# Patient Record
Sex: FEMALE | Race: OTHER | NOT HISPANIC OR LATINO | URBAN - METROPOLITAN AREA
[De-identification: names, ages, dates, MRNs, and addresses within clinical notes are randomized per-mention and may not be internally consistent; named-entity substitution may affect disease eponyms.]

---

## 2018-12-22 ENCOUNTER — EMERGENCY (EMERGENCY)
Facility: HOSPITAL | Age: 33
LOS: 1 days | Discharge: HOME | End: 2018-12-22
Attending: EMERGENCY MEDICINE | Admitting: EMERGENCY MEDICINE
Payer: COMMERCIAL

## 2018-12-22 ENCOUNTER — EMERGENCY (EMERGENCY)
Facility: HOSPITAL | Age: 33
LOS: 1 days | Discharge: ANOTHER HOSPITAL | End: 2018-12-22
Attending: EMERGENCY MEDICINE | Admitting: EMERGENCY MEDICINE
Payer: COMMERCIAL

## 2018-12-22 VITALS
RESPIRATION RATE: 18 BRPM | DIASTOLIC BLOOD PRESSURE: 78 MMHG | OXYGEN SATURATION: 96 % | HEART RATE: 96 BPM | TEMPERATURE: 98 F | SYSTOLIC BLOOD PRESSURE: 120 MMHG

## 2018-12-22 VITALS
RESPIRATION RATE: 18 BRPM | HEART RATE: 94 BPM | TEMPERATURE: 98 F | OXYGEN SATURATION: 98 % | DIASTOLIC BLOOD PRESSURE: 81 MMHG | SYSTOLIC BLOOD PRESSURE: 116 MMHG

## 2018-12-22 VITALS
HEART RATE: 75 BPM | RESPIRATION RATE: 18 BRPM | SYSTOLIC BLOOD PRESSURE: 130 MMHG | OXYGEN SATURATION: 98 % | DIASTOLIC BLOOD PRESSURE: 76 MMHG | TEMPERATURE: 99 F

## 2018-12-22 VITALS
RESPIRATION RATE: 16 BRPM | SYSTOLIC BLOOD PRESSURE: 117 MMHG | DIASTOLIC BLOOD PRESSURE: 79 MMHG | OXYGEN SATURATION: 98 % | TEMPERATURE: 99 F | HEART RATE: 88 BPM

## 2018-12-22 DIAGNOSIS — Z98.890 OTHER SPECIFIED POSTPROCEDURAL STATES: Chronic | ICD-10-CM

## 2018-12-22 DIAGNOSIS — R04.0 EPISTAXIS: ICD-10-CM

## 2018-12-22 DIAGNOSIS — Z79.899 OTHER LONG TERM (CURRENT) DRUG THERAPY: ICD-10-CM

## 2018-12-22 LAB
ALBUMIN SERPL ELPH-MCNC: 4.2 G/DL — SIGNIFICANT CHANGE UP (ref 3.4–5)
ALP SERPL-CCNC: 62 U/L — SIGNIFICANT CHANGE UP (ref 40–120)
ALT FLD-CCNC: 15 U/L — SIGNIFICANT CHANGE UP (ref 12–42)
ANION GAP SERPL CALC-SCNC: 9 MMOL/L — SIGNIFICANT CHANGE UP (ref 9–16)
APPEARANCE UR: CLEAR — SIGNIFICANT CHANGE UP
APTT BLD: 32.7 SEC — SIGNIFICANT CHANGE UP (ref 27.5–36.3)
AST SERPL-CCNC: 21 U/L — SIGNIFICANT CHANGE UP (ref 15–37)
BILIRUB SERPL-MCNC: 0.5 MG/DL — SIGNIFICANT CHANGE UP (ref 0.2–1.2)
BILIRUB UR-MCNC: NEGATIVE — SIGNIFICANT CHANGE UP
BUN SERPL-MCNC: 9 MG/DL — SIGNIFICANT CHANGE UP (ref 7–23)
CALCIUM SERPL-MCNC: 9.2 MG/DL — SIGNIFICANT CHANGE UP (ref 8.5–10.5)
CHLORIDE SERPL-SCNC: 105 MMOL/L — SIGNIFICANT CHANGE UP (ref 96–108)
CO2 SERPL-SCNC: 26 MMOL/L — SIGNIFICANT CHANGE UP (ref 22–31)
COLOR SPEC: YELLOW — SIGNIFICANT CHANGE UP
CREAT SERPL-MCNC: 0.64 MG/DL — SIGNIFICANT CHANGE UP (ref 0.5–1.3)
DIFF PNL FLD: NEGATIVE — SIGNIFICANT CHANGE UP
ETHANOL SERPL-MCNC: <3 MG/DL — SIGNIFICANT CHANGE UP
GLUCOSE SERPL-MCNC: 93 MG/DL — SIGNIFICANT CHANGE UP (ref 70–99)
GLUCOSE UR QL: NEGATIVE — SIGNIFICANT CHANGE UP
HCG SERPL-ACNC: <1 MIU/ML — SIGNIFICANT CHANGE UP
HCT VFR BLD CALC: 37.7 % — SIGNIFICANT CHANGE UP (ref 34.5–45)
HGB BLD-MCNC: 12.6 G/DL — SIGNIFICANT CHANGE UP (ref 11.5–15.5)
INR BLD: 0.97 — SIGNIFICANT CHANGE UP (ref 0.88–1.16)
KETONES UR-MCNC: NEGATIVE — SIGNIFICANT CHANGE UP
LEUKOCYTE ESTERASE UR-ACNC: NEGATIVE — SIGNIFICANT CHANGE UP
MCHC RBC-ENTMCNC: 30.2 PG — SIGNIFICANT CHANGE UP (ref 27–34)
MCHC RBC-ENTMCNC: 33.4 G/DL — SIGNIFICANT CHANGE UP (ref 32–36)
MCV RBC AUTO: 90.4 FL — SIGNIFICANT CHANGE UP (ref 80–100)
NITRITE UR-MCNC: NEGATIVE — SIGNIFICANT CHANGE UP
PCP SPEC-MCNC: SIGNIFICANT CHANGE UP
PH UR: 8.5 — HIGH (ref 5–8)
PLATELET # BLD AUTO: 294 K/UL — SIGNIFICANT CHANGE UP (ref 150–400)
POTASSIUM SERPL-MCNC: 4 MMOL/L — SIGNIFICANT CHANGE UP (ref 3.5–5.3)
POTASSIUM SERPL-SCNC: 4 MMOL/L — SIGNIFICANT CHANGE UP (ref 3.5–5.3)
PROT SERPL-MCNC: 7.7 G/DL — SIGNIFICANT CHANGE UP (ref 6.4–8.2)
PROT UR-MCNC: NEGATIVE MG/DL — SIGNIFICANT CHANGE UP
PROTHROM AB SERPL-ACNC: 10.7 SEC — SIGNIFICANT CHANGE UP (ref 10–12.9)
RBC # BLD: 4.17 M/UL — SIGNIFICANT CHANGE UP (ref 3.8–5.2)
RBC # FLD: 12.7 % — SIGNIFICANT CHANGE UP (ref 10.3–14.5)
SODIUM SERPL-SCNC: 140 MMOL/L — SIGNIFICANT CHANGE UP (ref 132–145)
SP GR SPEC: 1.01 — SIGNIFICANT CHANGE UP (ref 1–1.03)
UROBILINOGEN FLD QL: 0.2 E.U./DL — SIGNIFICANT CHANGE UP
WBC # BLD: 7.4 K/UL — SIGNIFICANT CHANGE UP (ref 3.8–10.5)
WBC # FLD AUTO: 7.4 K/UL — SIGNIFICANT CHANGE UP (ref 3.8–10.5)

## 2018-12-22 PROCEDURE — 99291 CRITICAL CARE FIRST HOUR: CPT

## 2018-12-22 PROCEDURE — 99283 EMERGENCY DEPT VISIT LOW MDM: CPT | Mod: 25

## 2018-12-22 PROCEDURE — 99284 EMERGENCY DEPT VISIT MOD MDM: CPT | Mod: 25

## 2018-12-22 RX ORDER — DEXTROAMPHETAMINE SACCHARATE, AMPHETAMINE ASPARTATE, DEXTROAMPHETAMINE SULFATE AND AMPHETAMINE SULFATE 1.875; 1.875; 1.875; 1.875 MG/1; MG/1; MG/1; MG/1
1 TABLET ORAL
Qty: 0 | Refills: 0 | COMMUNITY

## 2018-12-22 RX ORDER — OMEPRAZOLE 10 MG/1
1 CAPSULE, DELAYED RELEASE ORAL
Qty: 0 | Refills: 0 | COMMUNITY

## 2018-12-22 RX ORDER — DULOXETINE HYDROCHLORIDE 30 MG/1
1 CAPSULE, DELAYED RELEASE ORAL
Qty: 0 | Refills: 0 | COMMUNITY

## 2018-12-22 NOTE — ED PROVIDER NOTE - OBJECTIVE STATEMENT
33F c/o nosebleed. pt s/p nasal surgery at [facility] 11/29.  was evaluated at [facility], attempted pressure, afrin and packing, however persistent bleeding. pt transferred for ENT valuation.  no dizziness.  no trauma.

## 2018-12-22 NOTE — ED ADULT NURSE NOTE - OBJECTIVE STATEMENT
Presents to ED by transfer from [facility] for epistaxis evaluation by ENT MD.  Patient reports recent procedure to nose and was worried when bleeding did not stop on its own after 3 hours.  Patient denies any recent trauma to the area.

## 2018-12-22 NOTE — ED ADULT NURSE REASSESSMENT NOTE - NS ED NURSE REASSESS COMMENT FT1
Dr Gonzalez attempted to use a nasal  rhino, however pt is still bleeding through her nose. Afrin given as ordered. Manual pressure done. Pt's airway is patent at this time. Pt will be transferred to [facility] for further evaluation

## 2018-12-22 NOTE — ED PROVIDER NOTE - MEDICAL DECISION MAKING DETAILS
bilateral epistaxis s/p recent nasal surgery at [facility], attempted packing, pressure, Afrin, Hematoocrit checked, pt to be transferred to [facility] d/w Dr Hernandez ENT and Dr Aranda ED attending and transfer center

## 2018-12-22 NOTE — ED ADULT TRIAGE NOTE - CHIEF COMPLAINT QUOTE
Patient brought in by ambulance presents to the ED complaining of nose bleed ongoing for about 3 hours. + prior procedure to the nose x 3 weeks back.

## 2018-12-22 NOTE — ED ADULT NURSE NOTE - NSIMPLEMENTINTERV_GEN_ALL_ED
Implemented All Universal Safety Interventions:  [place] to call system. Call bell, personal items and telephone within reach. Instruct patient to call for assistance. Room bathroom lighting operational. Non-slip footwear when patient is off stretcher. Physically safe environment: no spills, clutter or unnecessary equipment. Stretcher in lowest position, wheels locked, appropriate side rails in place.

## 2018-12-22 NOTE — ED PROVIDER NOTE - MEDICAL DECISION MAKING DETAILS
transferred from [facility] for epistaxis. pt s/p nasal surg  -ENT consulted

## 2018-12-22 NOTE — ED ADULT NURSE NOTE - CHPI ED NUR SYMPTOMS NEG
no fever/no loss of consciousness/no bleeding gums/no chills/no syncope/no nausea/no numbness/no weakness/no vomiting

## 2018-12-22 NOTE — ED ADULT NURSE NOTE - PMH
Gastroesophageal reflux disease, esophagitis presence not specified Gastroesophageal reflux disease, esophagitis presence not specified    Primary narcolepsy without cataplexy    Sleep apnea, unspecified type

## 2018-12-22 NOTE — ED PROVIDER NOTE - NSFOLLOWUPINSTRUCTIONS_ED_ALL_ED_FT
Epistaxis    Epistaxis is the medical term for a nosebleed. Nosebleeds are common and can be caused by many conditions, such as injury, infections, dry environments, medicines, nose picking, and home heating and cooling systems. Try controlling your nosebleed by pinching your nose continuously for at least 10 minutes. Avoid lying down while you are having a nosebleed. Sit up and lean forward. Avoid blowing or sniffing your nose for a number of hours after having a nosebleed. Resume your normal activities as you are able, but avoid straining, lifting, or bending at the waist for several days. Maintain humidity in your home by using less air conditioning or by using a humidifier.     If your nose was packed by your health care provider, keep the packing inside of your nose until a health care provider removes it. If a balloon catheter was used to pack your nose, do not cut or remove it unless your health care provider has instructed you to do that.     Aspirin and blood thinners make bleeding more likely. If you are prescribed these medicines and you suffer from nosebleeds, ask your health care provider if you should stop taking the medicines or adjust the dose. Do not stop medicines unless directed by your health care provider.    SEEK IMMEDIATE MEDICAL CARE IF YOU HAVE ANY OF THE FOLLOWING SYMPTOMS: nosebleed lasting longer than 20 minutes, unusual bleeding from or bruising on other parts of your body, dizziness or lightheadedness, fainting, nosebleed occurring after a head injury, or fever.

## 2018-12-22 NOTE — CONSULT NOTE ADULT - SUBJECTIVE AND OBJECTIVE BOX
HPI:  The patient is a 33 year old female with a  past medical history significant for septoplasty and  turbinate reduction on 11/29 who presents to the emergency room with bleeding from the nose and mouth for the past several hours. HPI:  The patient is a 33 year old female with a past medical history significant for septoplasty and turbinate reduction on 11/29 who presents to the emergency room at [facility] with a chief complaint of bleeding from the nose and mouth for the past several hours. HPI:  The patient is a 33 year old female with a past medical history significant for septoplasty and turbinate reduction on 11/29 who presents to the emergency room at [facility] with a chief complaint of bleeding from the nose and mouth for the past several hours. Pt reports bleeding was more from the right nare. H/H was normal and the patient is hemodynamically stable. Initial attempts at packing and manual pressure were unsuccessful at controlling the bleeding. However, at time of ENT evaluation the bleeding appears to have slowed significantly. She reports spitting up a large clot a little while earlier. No prior episodes of epistaxis since her surgery. Other than nasal saline, she has not been using any other nasal medications. Denies fevers, chills, nausea, vomiting, difficulty breathing, pain or swelling in the nasal cavity.     PMH: naroclepsy  All: NKDA  SH: denies toxic habits    PE:  VSS, afebrile  Gen: No acute distress, alert and oriented  Resp: breathing comfortably on RA  Nose: left nare completely dry, healthy nasal mucosa/septum, no evidence of bleeding or exposed vessel. Right nare septal mucosa healthy with no bleeding or exposed vessels, minimal ooze appears to be from head of right inferior turbinate. Nasal cavity decongested with Afrin spray and then rolled surgicell strip was placed in the left nare followed by surgicell snow to provide tamponade and hemostasis to the region.  OC/OP: clear, no bleeding or clots in posterior OP  Neck: soft, no LAD    A/P: 33 F s/p recent septoplasty and turbinate reduction on 11/29, presents with a few hours of epistaxis from right nare, resolved at time of evaluation, Absorbable packing placed in right nare.  -Recommend afrin spray bilaterally BID for 3 days, then switch to nasal saline spray BID  -Can also apply vaseline or bacitracin to nasal septum and mucosa as needed  -Absorbable packing may fall out (this is okay); also no need for it be removed at a future date  -Nasal precautions: no nose blowing, open mouth sneezing, avoid heavy lifting or vigorous activity  -Avoid nasal picking  -Consider humidified air to avoid drying of nasal mucosa  -Pt should follow up with primary surgeon; Information also left for Dr. Bailon if she wishes to follow up or has additional concerns  -Observe to ensure resolution of bleeding; final dispo per ED  -Please page with questions or concerns  Seen with attending Dr. Bailon HPI:  The patient is a 33 year old female with a past medical history significant for septoplasty and turbinate reduction on 11/29 who presents to the emergency room at [facility] with a chief complaint of bleeding from the nose and mouth for the past several hours. Pt reports bleeding was more from the right nare. H/H was normal and the patient is hemodynamically stable. Initial attempts at packing and manual pressure were unsuccessful at controlling the bleeding. However, at time of ENT evaluation the bleeding appears to have slowed significantly. She reports spitting up a large clot a little while earlier. No prior episodes of epistaxis since her surgery. Other than nasal saline, she has not been using any other nasal medications. Denies fevers, chills, nausea, vomiting, difficulty breathing, pain or swelling in the nasal cavity.       HIV:    HIV Status:  · Offered: Unable to consent due to medical condition 	    PAST MEDICAL/SURGICAL/FAMILY/SOCIAL HISTORY:    Past Medical History:  Gastroesophageal reflux disease, esophagitis presence not specified    Primary narcolepsy without cataplexy    Sleep apnea, unspecified type.     Past Surgical History:  S/P nasal surgery.     Tobacco Usage:  · Tobacco Usage	Never smoker 	    ALLERGIES AND HOME MEDICATIONS:   Allergies:        Allergies:  	No Known Allergies:     Home Medications:   * Patient Currently Takes Medications as of 22-Dec-2018 00:55 documented in Structured Notes  · 	Cymbalta 20 mg oral delayed release capsule: 1 cap(s) orally 2 times a day  · 	Adderall 5 mg oral tablet: 1 tab(s) orally 2 times a day  · 	omeprazole 10 mg oral delayed release capsule: 1 cap(s) orally once a day      LABS:  CBC Full  -  ( 22 Dec 2018 01:18 )  WBC Count : 7.4 K/uL  Hemoglobin : 12.6 g/dL  Hematocrit : 37.7 %  Platelet Count - Automated : 294 K/uL  Mean Cell Volume : 90.4 fL  Mean Cell Hemoglobin : 30.2 pg  Mean Cell Hemoglobin Concentration : 33.4 g/dL  Auto Neutrophil # : x  Auto Lymphocyte # : x  Auto Monocyte # : x  Auto Eosinophil # : x  Auto Basophil # : x  Auto Neutrophil % : x  Auto Lymphocyte % : x  Auto Monocyte % : x  Auto Eosinophil % : x  Auto Basophil % : x      PE:  Vital Signs Last 24 Hrs  T(C): 36.9 (22 Dec 2018 04:49), Max: 37 (22 Dec 2018 00:29)  T(F): 98.5 (22 Dec 2018 04:49), Max: 98.6 (22 Dec 2018 00:29)  HR: 94 (22 Dec 2018 04:49) (75 - 96)  BP: 116/81 (22 Dec 2018 04:49) (116/81 - 130/76)  BP(mean): --  RR: 18 (22 Dec 2018 04:49) (16 - 18)  SpO2: 98% (22 Dec 2018 04:49) (96% - 98%)  Gen: No acute distress, alert and oriented  Resp: breathing comfortably on RA  Nose: left nare completely dry, healthy nasal mucosa/septum, no evidence of bleeding or exposed vessel. Right nare septal mucosa healthy with no bleeding or exposed vessels, minimal ooze appears to be from head of right inferior turbinate. Nasal cavity decongested with Afrin spray and then rolled surgicell strip was placed in the left nare followed by surgicell snow to provide tamponade and hemostasis to the region.  nasal/sinus endoscopy: maxillary sinus accessed via inferior meatus b/l with serosanguinous fluid, no fresh blood  OC/OP: clear, no bleeding or clots in posterior OP  Neck: soft, no LAD    FLexible Fiberoptic Laryngosocpy: +right posterior epistaxis, +oropharyngeal blood, clear to glottis, tvc mobile b/l, airway patent    A/P: 33 F s/p recent septoplasty and turbinate reduction on 11/29, presents with right posterior epistaxis  -Can also apply vaseline or bacitracin to nasal septum and mucosa as needed  -control of right posterior epistaxis at bedside with asborbable packing  -Nasal precautions: no nose blowing, open mouth sneezing, avoid heavy lifting or vigorous activity  -Avoid nasal picking  -Consider humidified air to avoid drying of nasal mucosa  -Observe to ensure resolution of bleeding; final dispo per ED  -Please page with questions or concerns

## 2018-12-22 NOTE — ED ADULT NURSE NOTE - NSFALLRSKPASTHIST_ED_ALL_ED
"Telephone Encounter by Jh Aragon at 06/14/17 03:20 PM     Author:  Jh Aragon Service:  (none) Author Type:  Certified Medical Assistant     Filed:  06/14/17 03:25 PM Encounter Date:  6/14/2017 Status:  Signed     :  Jh Aragon (Certified Medical Assistant)              Radha Vasquez [address]    Patient Age: 53year old   Refill request by:   Refill to be:[MO1.1T] ePrescribed to[MO1.1M] [address] [place], [place][MO1.2T]    Medication requested to be refilled:[MO1.1T]   Requested Prescriptions     Pending Prescriptions Disp Refills   â¢ buPROPion (WELLBUTRIN XL) 300 MG 24 hr tablet 30 Tab 0     Sig: Take 1 Tab by mouth daily. â¢ buPROPion (WELLBUTRIN XL) 150 MG 24 hr tablet 30 Tab 0     Sig: Take 1 Tab by mouth daily. Refused Prescriptions Disp Refills   â¢ FORFIVO  MG TB24 [Pharmacy Med Name: FORFIVO XL 450MG TABLETS]  0     Sig: TAKE 1 TABLET BY MOUTH DAILY     Refused By: Jose Beckford     Reason for Refusal: Refill not appropriate.[MO1.2T]     Please advise on medication instructions, patient states that she wants to do 150mg and 300mg of bupropion instead. [MO1.1M]   Next Appointment Scheduled:[MO1.1T] 09/13/17-Dr. Juarez[MO1.1M]      Next and Last Visit with Provider and Department  Next visit with Ashley Valles is on 09/13/2017 at  9:00 AM in [address] [place] [address]  Next visit with PSYCHIATRY is on 09/13/2017 at  9:00 AM in [facility] visit with Ashley Valles was on 06/14/2017 at 10:20 AM in [facility] visit with PSYCHIATRY was on 06/14/2017 at 10:20 AM in [address] [place] [address]: As of 03/10/2017 weight is 141 lbs. (63.957 kg). Height is 5' 8""(1.727 m).    BMI is 21.44 kg/(m^2) calculated from:     Height 5' 8\"" (1.727 m) as of 3/10/17     Weight 141 lb (63.957 kg) as of 3/10/17[MO1.1T]      No Known Allergies[MO1.2T]  Current outpatient prescriptions       Medication  Sig Dispense Refill   â¢ BuPROPion HCl ER, XL, 450 MG TB24 Take 450 mg by mouth " daily. 30 Each 3   â¢ clonazepam (KLONOPIN) 0.5 MG tablet Take 1 Tab by mouth daily as needed for Anxiety. 30 Tab 1   â¢ FLUoxetine HCl (PROZAC) 20 MG tablet Take 1.5 Tabs by mouth daily. 45 Tab 3   â¢ valacyclovir (VALTREX) 500 MG tablet TAKE 1 TABLET BY MOUTH TWICE DAILY FOR 5 DAYS WITH ONSET OF LESIONS 30 Tab 0        ROUTING:[MO1.1T] Patient's physician/staff[MO1.1M]        PCP: Liz Maldonado DO         INS: Payor: [employer] / Plan: P [id number] / Product Type: *No Product type* / Note: This is the primary coverage, but no account was found for this location or the patient's primary location.    ADDRESS:  [address] [place] [id number][GZ2.1I]             Revision History        User Key Date/Time User Provider Type Action    > MO1.2 06/14/17 03:25 PM Jh Hope Certified Medical Assistant Sign     MO1.1 06/14/17 03:20 PM Jh Hope Certified Medical Assistant     M - Manual, T - Template no

## 2018-12-22 NOTE — ED PROVIDER NOTE - PMH
Gastroesophageal reflux disease, esophagitis presence not specified    Primary narcolepsy without cataplexy    Sleep apnea, unspecified type

## 2018-12-22 NOTE — ED PROVIDER NOTE - PROGRESS NOTE DETAILS
packed by ENT - no bleeding, pt can f/u with ENT  I have discussed the discharge plan with the patient. The patient agrees with the plan, as discussed.  The patient understands Emergency Department diagnosis is a preliminary diagnosis often based on limited information and that the patient must adhere to the follow-up plan as discussed.  The patient understands that if the symptoms worsen the patient may return to the Emergency Department at any time for further evaluation and treatment.

## 2018-12-22 NOTE — CONSULT NOTE ADULT - SUBJECTIVE AND OBJECTIVE BOX
HPI:  The patient is a 33 year old female with a past medical history significant for septoplasty and turbinate reduction on 11/29 who presents to the emergency room at [facility] with a chief complaint of bleeding from the nose and mouth for the past several hours. Pt reports bleeding was more from the right nare. H/H was normal and the patient is hemodynamically stable. Initial attempts at packing and manual pressure were unsuccessful at controlling the bleeding. However, at time of ENT evaluation the bleeding appears to have slowed significantly. She reports spitting up a large clot a little while earlier. No prior episodes of epistaxis since her surgery. Other than nasal saline, she has not been using any other nasal medications. Denies fevers, chills, nausea, vomiting, difficulty breathing, pain or swelling in the nasal cavity.       HIV:    HIV Status:  · Offered: Unable to consent due to medical condition 	    PAST MEDICAL/SURGICAL/FAMILY/SOCIAL HISTORY:    Past Medical History:  Gastroesophageal reflux disease, esophagitis presence not specified    Primary narcolepsy without cataplexy    Sleep apnea, unspecified type.     Past Surgical History:  S/P nasal surgery.     Tobacco Usage:  · Tobacco Usage	Never smoker 	    ALLERGIES AND HOME MEDICATIONS:   Allergies:        Allergies:  	No Known Allergies:     Home Medications:   * Patient Currently Takes Medications as of 22-Dec-2018 00:55 documented in Structured Notes  · 	Cymbalta 20 mg oral delayed release capsule: 1 cap(s) orally 2 times a day  · 	Adderall 5 mg oral tablet: 1 tab(s) orally 2 times a day  · 	omeprazole 10 mg oral delayed release capsule: 1 cap(s) orally once a day      LABS:  CBC Full  -  ( 22 Dec 2018 01:18 )  WBC Count : 7.4 K/uL  Hemoglobin : 12.6 g/dL  Hematocrit : 37.7 %  Platelet Count - Automated : 294 K/uL  Mean Cell Volume : 90.4 fL  Mean Cell Hemoglobin : 30.2 pg  Mean Cell Hemoglobin Concentration : 33.4 g/dL  Auto Neutrophil # : x  Auto Lymphocyte # : x  Auto Monocyte # : x  Auto Eosinophil # : x  Auto Basophil # : x  Auto Neutrophil % : x  Auto Lymphocyte % : x  Auto Monocyte % : x  Auto Eosinophil % : x  Auto Basophil % : x      PE:  Vital Signs Last 24 Hrs  T(C): 36.9 (22 Dec 2018 04:49), Max: 37 (22 Dec 2018 00:29)  T(F): 98.5 (22 Dec 2018 04:49), Max: 98.6 (22 Dec 2018 00:29)  HR: 94 (22 Dec 2018 04:49) (75 - 96)  BP: 116/81 (22 Dec 2018 04:49) (116/81 - 130/76)  BP(mean): --  RR: 18 (22 Dec 2018 04:49) (16 - 18)  SpO2: 98% (22 Dec 2018 04:49) (96% - 98%)  Gen: No acute distress, alert and oriented  Resp: breathing comfortably on RA  Nose: left nare completely dry, healthy nasal mucosa/septum, no evidence of bleeding or exposed vessel. Right nare septal mucosa healthy with no bleeding or exposed vessels, minimal ooze appears to be from head of right inferior turbinate. Nasal cavity decongested with Afrin spray and then rolled surgicell strip was placed in the left nare followed by surgicell snow to provide tamponade and hemostasis to the region.  nasal/sinus endoscopy: maxillary sinus accessed via inferior meatus b/l with serosanguinous fluid, no fresh blood  OC/OP: clear, no bleeding or clots in posterior OP  Neck: soft, no LAD    FLexible Fiberoptic Laryngosocpy: +right posterior epistaxis, +oropharyngeal blood, clear to glottis, tvc mobile b/l, airway patent    A/P: 33 F s/p recent septoplasty and turbinate reduction on 11/29, presents with right posterior epistaxis  -Can also apply vaseline or bacitracin to nasal septum and mucosa as needed  -control of right posterior epistaxis at bedside with asborbable packing  -Nasal precautions: no nose blowing, open mouth sneezing, avoid heavy lifting or vigorous activity  -Avoid nasal picking  -Consider humidified air to avoid drying of nasal mucosa  -Observe to ensure resolution of bleeding; final dispo per ED  -Please page with questions or concerns
